# Patient Record
Sex: MALE | Race: WHITE | Employment: FULL TIME | URBAN - METROPOLITAN AREA
[De-identification: names, ages, dates, MRNs, and addresses within clinical notes are randomized per-mention and may not be internally consistent; named-entity substitution may affect disease eponyms.]

---

## 2020-08-24 LAB
AVERAGE GLUCOSE: NORMAL
HBA1C MFR BLD: 6.1 %

## 2020-11-23 ENCOUNTER — APPOINTMENT (OUTPATIENT)
Dept: CT IMAGING | Age: 50
End: 2020-11-23
Payer: COMMERCIAL

## 2020-11-23 ENCOUNTER — HOSPITAL ENCOUNTER (EMERGENCY)
Age: 50
Discharge: HOME | End: 2020-11-23
Attending: EMERGENCY MEDICINE
Payer: COMMERCIAL

## 2020-11-23 VITALS
TEMPERATURE: 97.8 F | BODY MASS INDEX: 38.22 KG/M2 | RESPIRATION RATE: 18 BRPM | WEIGHT: 273 LBS | DIASTOLIC BLOOD PRESSURE: 81 MMHG | HEIGHT: 71 IN | SYSTOLIC BLOOD PRESSURE: 163 MMHG | HEART RATE: 70 BPM | OXYGEN SATURATION: 98 %

## 2020-11-23 PROCEDURE — 99283 EMERGENCY DEPT VISIT LOW MDM: CPT

## 2020-11-23 PROCEDURE — 72131 CT LUMBAR SPINE W/O DYE: CPT

## 2020-11-23 PROCEDURE — 6360000002 HC RX W HCPCS: Performed by: EMERGENCY MEDICINE

## 2020-11-23 PROCEDURE — 6370000000 HC RX 637 (ALT 250 FOR IP): Performed by: EMERGENCY MEDICINE

## 2020-11-23 PROCEDURE — 96372 THER/PROPH/DIAG INJ SC/IM: CPT

## 2020-11-23 RX ORDER — METHOCARBAMOL 500 MG/1
500 TABLET, FILM COATED ORAL 3 TIMES DAILY
Qty: 15 TABLET | Refills: 0 | Status: SHIPPED | OUTPATIENT
Start: 2020-11-23 | End: 2020-11-28

## 2020-11-23 RX ORDER — METHOCARBAMOL 500 MG/1
1000 TABLET, FILM COATED ORAL ONCE
Status: COMPLETED | OUTPATIENT
Start: 2020-11-23 | End: 2020-11-23

## 2020-11-23 RX ORDER — METHOCARBAMOL 500 MG/1
500 TABLET, FILM COATED ORAL 3 TIMES DAILY
Status: DISCONTINUED | OUTPATIENT
Start: 2020-11-23 | End: 2020-11-23

## 2020-11-23 RX ORDER — KETOROLAC TROMETHAMINE 30 MG/ML
30 INJECTION, SOLUTION INTRAMUSCULAR; INTRAVENOUS ONCE
Status: COMPLETED | OUTPATIENT
Start: 2020-11-23 | End: 2020-11-23

## 2020-11-23 RX ORDER — LIDOCAINE 4 G/G
1 PATCH TOPICAL DAILY
Status: DISCONTINUED | OUTPATIENT
Start: 2020-11-23 | End: 2020-11-23 | Stop reason: HOSPADM

## 2020-11-23 RX ADMIN — KETOROLAC TROMETHAMINE 30 MG: 30 INJECTION, SOLUTION INTRAMUSCULAR; INTRAVENOUS at 18:32

## 2020-11-23 RX ADMIN — METHOCARBAMOL TABLETS 1000 MG: 500 TABLET, COATED ORAL at 18:31

## 2020-11-23 ASSESSMENT — PAIN SCALES - GENERAL
PAINLEVEL_OUTOF10: 6
PAINLEVEL_OUTOF10: 6

## 2020-11-23 ASSESSMENT — PAIN DESCRIPTION - LOCATION: LOCATION: BACK

## 2020-11-23 ASSESSMENT — PAIN DESCRIPTION - ORIENTATION: ORIENTATION: LOWER

## 2020-11-23 ASSESSMENT — PAIN DESCRIPTION - PAIN TYPE: TYPE: CHRONIC PAIN;ACUTE PAIN

## 2020-12-01 NOTE — ED PROVIDER NOTES
Emergency Department Encounter    Patient: Shakeel Mcneill  MRN: [id number]  : 1970  Date of Evaluation: 2020  ED Provider:  Yusuf English      Triage Chief Complaint:   Back Pain      [place]:  Shakeel Mcneill is a 44 y.o. male that presents to the emergency department for evaluation of evaluation of acute exacerbation chronic low back pain. Pain started 2 days ago and has been persistent patient describes a dull, achy pain localized to the low back. Denies any radiating pain. It is positional in nature as symptoms are exacerbated by movement and forward flexion. Denies loss of bowel or bladder function. Denies urinary retention. Denies saddle anesthesia. Patient denies history of IV drug use. Denies any personal history of cancer. No recent steroid use. No weight loss or history of malignancy. Denies difficulty ambulating or ataxia. Denies difficulty bearing weight on the lower extremities. Denies abdominal pain, nausea, vomiting, diarrhea, constipation, hematochezia, melena. Denies dysuria or hematuria. Denies fevers, chills, diaphoresis, night sweats. Denies syncope, dizziness, lightheadedness, numbness, tingling, weakness, paresthesias, focal deficits. Denies additional precipitating, modifying, alleviating factors. ROS - see HPI, below listed is current ROS at time of my eval:  A complete 10 point review of systems was performed and is as dictated above, otherwise negative.     Past Medical History:   Diagnosis Date    Abnormal laboratory test result 2012    elevated HgbA1C at 6.4%    Depressive disorder     Diverticulosis of colon     DJD (degenerative joint disease), multiple sites     Extrapyramidal disease     History of diverticulitis of colon     History of endocarditis     Hyperlipemia     Hypertension     Low back pain     Obstructive sleep apnea of adult     uses CPAP    Other abnormal glucose     S/P aortic valve replacement     [facility] valve-Dr Keyanna Young [facility]    TMJ disease        Past Surgical History:   Procedure Laterality Date    CARDIAC CATHETERIZATION  2010       History reviewed. No pertinent family history. Social History     Socioeconomic History    Marital status:      Spouse name: Not on file    Number of children: Not on file    Years of education: Not on file    Highest education level: Not on file   Occupational History    Not on file   Social Needs    Financial resource strain: Not on file    Food insecurity     Worry: Not on file     Inability: Not on file    Transportation needs     Medical: Not on file     Non-medical: Not on file   Tobacco Use    Smoking status: Never Smoker   Substance and Sexual Activity    Alcohol use: Not on file    Drug use: Not on file    Sexual activity: Not on file   Lifestyle    Physical activity     Days per week: Not on file     Minutes per session: Not on file    Stress: Not on file   Relationships    Social connections     Talks on phone: Not on file     Gets together: Not on file     Attends Holiness service: Not on file     Active member of club or organization: Not on file     Attends meetings of clubs or organizations: Not on file     Relationship status: Not on file    Intimate partner violence     Fear of current or ex partner: Not on file     Emotionally abused: Not on file     Physically abused: Not on file     Forced sexual activity: Not on file   Other Topics Concern    Not on file   Social History Narrative    Not on file       No current facility-administered medications for this encounter.       Current Outpatient Medications   Medication Sig Dispense Refill    PARoxetine (PAXIL) 20 MG tablet Take 1 tablet by mouth every morning 30 tablet 0    lisinopril (PRINIVIL;ZESTRIL) 10 MG tablet Take 1 tablet by mouth daily 30 tablet 5    traMADol (ULTRAM) 50 MG tablet Take 1-2 tablets by mouth every 6 hours as needed for Pain 240 tablet 5    muscle use. Abdominal: Soft. Nontender. Nondistended. No peritoneal signs. Bowel sounds are auscultated in all 4 quadrants. No midline pulsatile abdominal masses, thrills, bruits. Extremities: No clubbing, cyanosis, or edema. No joint swelling. Normal tone. Patient is able to move bilateral upper and lower extremities with 5/5 muscle strength. Ambulates without ataxia or gait instability. Back:  No CVA tenderness to palpation. No midline tenderness to palpation at the midline in the cervical spine, thoracic spine, lumbar spine. There is paraspinal muscle tenderness at the lumbar spine. No step-offs or deformities. No clinical signs of cauda equina syndrome or cord compression. Neurological:  Alert and oriented times 3. No focal or lateralizing neurologic deficits. Sensations grossly intact to light touch and two-point discrimination in the L3-S1 dermatomal distribution of bilateral lower extremities. 2/4 patellar and Achilles deep tendon reflexes. Negative straight leg raise bilaterally. Vascular: 2/4 femoral, DP, PT pulses in the lower extremities. Brisk capillary refill. Compartments are soft and compressible  Psychiatric: Cooperative. I have reviewed and interpreted all of the currently available diagnostic results from this visit:    Radiographs:  Radiologist's Report Reviewed:  Ct Lumbar Spine Wo Contrast    Result Date: 11/23/2020  EXAMINATION: CT OF THE LUMBAR SPINE WITHOUT CONTRAST  11/23/2020 TECHNIQUE: CT of the lumbar spine was performed without the administration of intravenous contrast. Multiplanar reformatted images are provided for review. Dose modulation, iterative reconstruction, and/or weight based adjustment of the mA/kV was utilized to reduce the radiation dose to as low as reasonably achievable.  COMPARISON: None HISTORY: ORDERING SYSTEM PROVIDED HISTORY: PAIN TECHNOLOGIST PROVIDED HISTORY: Reason for exam:->pain Reason for Exam: pain; Pain Acuity: Acute Type of Exam: Initial Additional signs and symptoms: Reports acute exacerbation of chronic lower back pain x2 days Relevant Medical/Surgical History: NKI, pt states hx DJD FINDINGS: BONES/ALIGNMENT: Alignment and vertebral body heights are normal.  No fracture is evident. Bone density is normal.  At T10-11, there is a partially visualized large anterior osteophyte. DEGENERATIVE CHANGES: With exception of L5-S1, disc spaces are well preserved and no significant degenerative changes are evident. At L5-S1, there is moderate disc space narrowing and mild posterior disc bulge as well as mild facet hypertrophy. This may result in bilateral foraminal stenosis, left worse than right. SOFT TISSUES/RETROPERITONEUM: No paraspinal mass is seen. Mild degenerative change at L5-S1 which appears to result in bilateral foraminal stenosis worse on the left. The lumbar spine otherwise appears normal. Partially visualized large anterior osteophyte at T10-11. MDM:  Patient was seen and evaluated in the emergency department by myself. A thorough history and physical exam were performed, prior medical records were reviewed. Upon arrival to the emergency department, patient's vital signs were noted. No tachycardia, tachypnea, hypoxia. Differential diagnoses and treatment plan were discussed with the patient. Toradol and robaxin were provided for pain. Pertinent radiographic studies were performed. Once results were available reviewed by myself. CT lumbar spine without IV contrast weighted report reads mild degenerative change at L5-S1 which appears to result in bilateral foraminal stenosis worse on the left. The lumbar spine otherwise appears normal.  Partially visualized large anterior osteophyte at T10 to T11    On repeat evaluations, patient remains hemodynamically stable. Patient notes slight improvement in symptoms with therapy provided in the emergency department.   Patient presents with acute exacerbation of chronic low back pain.  Repeat neurovascular exams remained stable. There are no focal motor or sensory deficits. Low clinical suspicion for major or malignant pathology such as, but not limited to, cauda equina syndrome, acute spinal cord pathology, spinal epidural abscess, spinal cord compression. The evidence indicates that the patient is very low risk for an acute spinal emergency and this is consistent with my clinical intuition. There are no red flag signs or symptoms to warrant emergent MRI. I believe the patient is a good candidate for outpatient symptomatic treatment. The patient was instructed on this treatment and the course that this type of back pain typically follows. I also discussed worrisome symptoms to monitor for at home including, but not limited to, numbness or weakness in the lower extremities, bowel or bladder dysfunction, and numbness in the groin. Patient instructed to follow-up with primary care physician for reevaluation. Instructed that if symptoms persist or worsen, they will require MRI of the back for further evaluation. Instructed to return to the emergency department immediately with any new, worsening, concerning symptoms. Prescription for robaxin was provided. Side effect profile of this medication was discussed. Additional verbal and printed discharge instructions provided. Patient expressed understanding and was agreeable with discharge plan. Patient was discharged in stable, ambulatory condition. Clinical Impression:  1. Acute bilateral low back pain without sciatica    2. Strain of lumbar region, initial encounter    3.  Spinal stenosis at L4-L5 level        Disposition referral:  Nancy Kim MD  [address]  [facility] [phone]      Please follow-up with your primary care provider for reevaluation    [address]  [phone]  Go to   Immediately with any new, worsening, concerning symptoms. Disposition medications:  Discharge Medication List as of 11/23/2020  7:16 PM      START taking these medications    Details   methocarbamol (ROBAXIN) 500 MG tablet Take 1 tablet by mouth 3 times daily for 5 days, Disp-15 tablet,R-0Print             ED Provider Disposition:  DISPOSITION Decision To Discharge 11/23/2020 07:11:03 PM        Comment: Please note this report has been produced using speech recognition software and may contain errors related to that system including errors in grammar, punctuation, and spelling, as well as words and phrases that may be inappropriate. Efforts were made to edit the dictations.        [address] [facility],   11/30/20 0366